# Patient Record
Sex: MALE | Race: BLACK OR AFRICAN AMERICAN | NOT HISPANIC OR LATINO | Employment: FULL TIME | URBAN - NONMETROPOLITAN AREA
[De-identification: names, ages, dates, MRNs, and addresses within clinical notes are randomized per-mention and may not be internally consistent; named-entity substitution may affect disease eponyms.]

---

## 2021-03-12 ENCOUNTER — IMMUNIZATION (OUTPATIENT)
Dept: VACCINE CLINIC | Facility: HOSPITAL | Age: 61
End: 2021-03-12

## 2021-03-12 PROCEDURE — 91300 HC SARSCOV02 VAC 30MCG/0.3ML IM: CPT | Performed by: INTERNAL MEDICINE

## 2021-03-12 PROCEDURE — 0001A: CPT | Performed by: INTERNAL MEDICINE

## 2021-04-02 ENCOUNTER — IMMUNIZATION (OUTPATIENT)
Dept: VACCINE CLINIC | Facility: HOSPITAL | Age: 61
End: 2021-04-02

## 2021-04-02 PROCEDURE — 0002A: CPT | Performed by: INTERNAL MEDICINE

## 2021-04-02 PROCEDURE — 91300 HC SARSCOV02 VAC 30MCG/0.3ML IM: CPT | Performed by: INTERNAL MEDICINE

## 2024-02-27 ENCOUNTER — OFFICE VISIT (OUTPATIENT)
Dept: GASTROENTEROLOGY | Facility: CLINIC | Age: 64
End: 2024-02-27
Payer: COMMERCIAL

## 2024-02-27 VITALS
HEIGHT: 74 IN | HEART RATE: 96 BPM | BODY MASS INDEX: 21.69 KG/M2 | OXYGEN SATURATION: 98 % | RESPIRATION RATE: 16 BRPM | WEIGHT: 169 LBS | SYSTOLIC BLOOD PRESSURE: 160 MMHG | DIASTOLIC BLOOD PRESSURE: 90 MMHG

## 2024-02-27 DIAGNOSIS — R10.13 DYSPEPSIA: ICD-10-CM

## 2024-02-27 DIAGNOSIS — R19.4 CHANGE IN BOWEL HABITS: Primary | ICD-10-CM

## 2024-02-27 DIAGNOSIS — R10.9 ABDOMINAL PAIN, UNSPECIFIED ABDOMINAL LOCATION: ICD-10-CM

## 2024-02-27 RX ORDER — SODIUM, POTASSIUM,MAG SULFATES 17.5-3.13G
1 SOLUTION, RECONSTITUTED, ORAL ORAL EVERY 12 HOURS
Qty: 354 ML | Refills: 0 | Status: SHIPPED | OUTPATIENT
Start: 2024-02-27 | End: 2024-02-28

## 2024-02-27 RX ORDER — AMLODIPINE BESYLATE 10 MG/1
1 TABLET ORAL DAILY
COMMUNITY
Start: 2023-12-05

## 2024-02-27 RX ORDER — BISOPROLOL FUMARATE AND HYDROCHLOROTHIAZIDE 5; 6.25 MG/1; MG/1
1 TABLET ORAL DAILY
COMMUNITY
Start: 2023-12-05

## 2024-02-27 NOTE — PROGRESS NOTES
New Patient Consult      Date: 2024   Patient Name: Mikie Jimenes  MRN: [id number]  : 1960     Referring Physician: Phyllis Reynolds APRN    Chief Complaint   Patient presents with    Abdominal Pain    Weight Loss       History of Present Illness: Mikie Jimenes is a 64 y.o. male who is here today to establish care with Gastroenterology.    Complains of abdominal pain, which is located sometimes in the upper abdomen, but at other times in the lower abdomen as well.  Usually after eating, although there are no specific dietary triggers.    Has been occurring for the past few months.  Has noticed weight loss as well.  No other localizing symptoms.  Denies blood per rectum.    He does notice an associated change in bowel habits however, and usually after the abdominal pain he has a prompt bowel movement, sometimes diarrhea, sometimes regular consistency.  No melena.  Denies NSAID use.    No prior upper endoscopy or colonoscopy.  No recent abdominal imaging.    Subjective      Past Medical History:   Diagnosis Date    Hyperlipidemia     Hypertension        Past Surgical History:   Procedure Laterality Date    CERVICAL SPINE SURGERY N/A        History reviewed. No pertinent family history.    Social History     Socioeconomic History    Marital status:    Tobacco Use    Smoking status: Every Day     Packs/day: 0.25     Years: 30.00     Additional pack years: 0.00     Total pack years: 7.50     Types: Cigarettes         Current Outpatient Medications:     amLODIPine (NORVASC) 10 MG tablet, Take 1 tablet by mouth Daily., Disp: , Rfl:     bisoprolol-hydrochlorothiazide (ZIAC) 5-6.25 MG per tablet, Take 1 tablet by mouth Daily., Disp: , Rfl:     sodium-potassium-magnesium sulfates (Suprep Bowel Prep Kit) 17.5-3.13-1.6 GM/177ML solution oral solution, Take 1 bottle by mouth Every 12 (Twelve) Hours for 1 day., Disp: 354 mL, Rfl: 0    No Known Allergies    Review of Systems   Constitutional:   "Positive for unexpected weight loss.   Gastrointestinal:  Positive for abdominal pain and diarrhea.   All other systems reviewed and are negative.      The following portions of the patient's history were reviewed and updated as appropriate: allergies, current medications, past family history, past medical history, past social history, past surgical history and problem list.    Objective     Physical Exam:  Vitals:    02/27/24 1544   BP: 160/90   Pulse: 96   Resp: 16   SpO2: 98%   Weight: 76.7 kg (169 lb)   Height: 188 cm (74\")       Physical Exam  Constitutional:       General: He is not in acute distress.     Appearance: Normal appearance.   HENT:      Head: Normocephalic and atraumatic.      Nose: Nose normal.      Mouth/Throat:      Mouth: Mucous membranes are moist.   Eyes:      General: No scleral icterus.     Conjunctiva/sclera: Conjunctivae normal.   Cardiovascular:      Rate and Rhythm: Normal rate.   Pulmonary:      Effort: Pulmonary effort is normal. No respiratory distress.   Abdominal:      General: There is no distension.      Tenderness: There is no abdominal tenderness. There is no guarding.   Musculoskeletal:         General: No deformity or signs of injury.      Cervical back: Neck supple. No rigidity.   Skin:     Capillary Refill: Capillary refill takes less than 2 seconds.      Coloration: Skin is not jaundiced or pale.   Neurological:      General: No focal deficit present.      Mental Status: He is alert and oriented to person, place, and time.   Psychiatric:         Mood and Affect: Mood normal.         Behavior: Behavior normal.         Results Review:   I have reviewed the patient's new clinical and imaging results and agree with the interpretation.     No visits with results within 90 Day(s) from this visit.   Latest known visit with results is:   No results found for any previous visit.      No radiology results for the last 90 days.    Assessment / Plan      Assessment & Plan:  Diagnoses " and all orders for this visit:    1. Change in bowel habits (Primary)  -     Follow Anesthesia Guidelines / Protocol; Future  -     Obtain Informed Consent; Future  -     Case Request; Standing  -     Case Request  -     sodium-potassium-magnesium sulfates (Suprep Bowel Prep Kit) 17.5-3.13-1.6 GM/177ML solution oral solution; Take 1 bottle by mouth Every 12 (Twelve) Hours for 1 day.  Dispense: 354 mL; Refill: 0  -     CT Abdomen Pelvis With Contrast; Future    2. Dyspepsia  -     Follow Anesthesia Guidelines / Protocol; Future  -     Obtain Informed Consent; Future  -     Case Request; Standing  -     Case Request  -     sodium-potassium-magnesium sulfates (Suprep Bowel Prep Kit) 17.5-3.13-1.6 GM/177ML solution oral solution; Take 1 bottle by mouth Every 12 (Twelve) Hours for 1 day.  Dispense: 354 mL; Refill: 0  -     CT Abdomen Pelvis With Contrast; Future    3. Abdominal pain, unspecified abdominal location  -     Follow Anesthesia Guidelines / Protocol; Future  -     Obtain Informed Consent; Future  -     Case Request; Standing  -     Case Request  -     sodium-potassium-magnesium sulfates (Suprep Bowel Prep Kit) 17.5-3.13-1.6 GM/177ML solution oral solution; Take 1 bottle by mouth Every 12 (Twelve) Hours for 1 day.  Dispense: 354 mL; Refill: 0  -     CT Abdomen Pelvis With Contrast; Future    Other orders  -     Follow Anesthesia Guidelines / Protocol; Standing  -     Verify NPO; Standing        Given the change in bowel habits, dyspepsia, other gastrointestinal symptoms without any obvious etiology, need formal evaluation.  This includes endoscopic evaluation, including other testing as above.      Will also obtain a CT scan given the weight loss and abdominal pain.    Broad differential diagnosis. Includes, but is not limited to colitis, ileitis, structural pathology, strictures, inflammatory disease, such as Crohn's or ulcerative colitis, polyps, peptic ulcer disease, gastritis, Helicobacter pylori.    Plan  for EGD/colonoscopy with monitored anesthesia care. Counseled in detail regarding the risks, benefits and alternatives of the procedure, including but not limited to perforation, bleeding, infection, post-operative pain, complications from anesthesia, aspiration, cardiac decompensation, need for further procedures or surgery, which may occur in approximately 1 in 1000 procedures. Counseled also that endoscopy and colonoscopy are not perfect tests, and there is a possibility of missed diagnoses, including but not limited to polyps or cancer. Counseled regarding pre procedural instructions. Also discussed bowel preparation. Counseled regarding potential, albeit rare complications with bowel preparation specific to this patients medical history and medications, including but not limited to renal insufficiency/failure, electrolyte abnormalities, which may lead to other complications. Hydration is encouraged. Written instructions provided. Instructed to arrange for a ride home for the day of procedure. Patient is in agreement with the above plan and voices understanding of the plan as well as the associated risks and the alternative evaluation/treatment/medication options.       Follow Up:   Return for Follow-up 3 months post procedure.    Cathie Navarro MD  [facility] [place]    2/27/2024  16:09 EST    Part of this note may be an electronic transcription/translation of spoken language to printed text using the Dragon Dictation System.

## 2024-02-27 NOTE — H&P (VIEW-ONLY)
New Patient Consult      Date: 2024   Patient Name: Mikie Jimenes  MRN: [id number]  : 1960     Referring Physician: Phyllis Reynolds APRN    Chief Complaint   Patient presents with    Abdominal Pain    Weight Loss       History of Present Illness: Mikie Jimenes is a 64 y.o. male who is here today to establish care with Gastroenterology.    Complains of abdominal pain, which is located sometimes in the upper abdomen, but at other times in the lower abdomen as well.  Usually after eating, although there are no specific dietary triggers.    Has been occurring for the past few months.  Has noticed weight loss as well.  No other localizing symptoms.  Denies blood per rectum.    He does notice an associated change in bowel habits however, and usually after the abdominal pain he has a prompt bowel movement, sometimes diarrhea, sometimes regular consistency.  No melena.  Denies NSAID use.    No prior upper endoscopy or colonoscopy.  No recent abdominal imaging.    Subjective      Past Medical History:   Diagnosis Date    Hyperlipidemia     Hypertension        Past Surgical History:   Procedure Laterality Date    CERVICAL SPINE SURGERY N/A        History reviewed. No pertinent family history.    Social History     Socioeconomic History    Marital status:    Tobacco Use    Smoking status: Every Day     Packs/day: 0.25     Years: 30.00     Additional pack years: 0.00     Total pack years: 7.50     Types: Cigarettes         Current Outpatient Medications:     amLODIPine (NORVASC) 10 MG tablet, Take 1 tablet by mouth Daily., Disp: , Rfl:     bisoprolol-hydrochlorothiazide (ZIAC) 5-6.25 MG per tablet, Take 1 tablet by mouth Daily., Disp: , Rfl:     sodium-potassium-magnesium sulfates (Suprep Bowel Prep Kit) 17.5-3.13-1.6 GM/177ML solution oral solution, Take 1 bottle by mouth Every 12 (Twelve) Hours for 1 day., Disp: 354 mL, Rfl: 0    No Known Allergies    Review of Systems   Constitutional:   "Positive for unexpected weight loss.   Gastrointestinal:  Positive for abdominal pain and diarrhea.   All other systems reviewed and are negative.      The following portions of the patient's history were reviewed and updated as appropriate: allergies, current medications, past family history, past medical history, past social history, past surgical history and problem list.    Objective     Physical Exam:  Vitals:    02/27/24 1544   BP: 160/90   Pulse: 96   Resp: 16   SpO2: 98%   Weight: 76.7 kg (169 lb)   Height: 188 cm (74\")       Physical Exam  Constitutional:       General: He is not in acute distress.     Appearance: Normal appearance.   HENT:      Head: Normocephalic and atraumatic.      Nose: Nose normal.      Mouth/Throat:      Mouth: Mucous membranes are moist.   Eyes:      General: No scleral icterus.     Conjunctiva/sclera: Conjunctivae normal.   Cardiovascular:      Rate and Rhythm: Normal rate.   Pulmonary:      Effort: Pulmonary effort is normal. No respiratory distress.   Abdominal:      General: There is no distension.      Tenderness: There is no abdominal tenderness. There is no guarding.   Musculoskeletal:         General: No deformity or signs of injury.      Cervical back: Neck supple. No rigidity.   Skin:     Capillary Refill: Capillary refill takes less than 2 seconds.      Coloration: Skin is not jaundiced or pale.   Neurological:      General: No focal deficit present.      Mental Status: He is alert and oriented to person, place, and time.   Psychiatric:         Mood and Affect: Mood normal.         Behavior: Behavior normal.         Results Review:   I have reviewed the patient's new clinical and imaging results and agree with the interpretation.     No visits with results within 90 Day(s) from this visit.   Latest known visit with results is:   No results found for any previous visit.      No radiology results for the last 90 days.    Assessment / Plan      Assessment & Plan:  Diagnoses " and all orders for this visit:    1. Change in bowel habits (Primary)  -     Follow Anesthesia Guidelines / Protocol; Future  -     Obtain Informed Consent; Future  -     Case Request; Standing  -     Case Request  -     sodium-potassium-magnesium sulfates (Suprep Bowel Prep Kit) 17.5-3.13-1.6 GM/177ML solution oral solution; Take 1 bottle by mouth Every 12 (Twelve) Hours for 1 day.  Dispense: 354 mL; Refill: 0  -     CT Abdomen Pelvis With Contrast; Future    2. Dyspepsia  -     Follow Anesthesia Guidelines / Protocol; Future  -     Obtain Informed Consent; Future  -     Case Request; Standing  -     Case Request  -     sodium-potassium-magnesium sulfates (Suprep Bowel Prep Kit) 17.5-3.13-1.6 GM/177ML solution oral solution; Take 1 bottle by mouth Every 12 (Twelve) Hours for 1 day.  Dispense: 354 mL; Refill: 0  -     CT Abdomen Pelvis With Contrast; Future    3. Abdominal pain, unspecified abdominal location  -     Follow Anesthesia Guidelines / Protocol; Future  -     Obtain Informed Consent; Future  -     Case Request; Standing  -     Case Request  -     sodium-potassium-magnesium sulfates (Suprep Bowel Prep Kit) 17.5-3.13-1.6 GM/177ML solution oral solution; Take 1 bottle by mouth Every 12 (Twelve) Hours for 1 day.  Dispense: 354 mL; Refill: 0  -     CT Abdomen Pelvis With Contrast; Future    Other orders  -     Follow Anesthesia Guidelines / Protocol; Standing  -     Verify NPO; Standing        Given the change in bowel habits, dyspepsia, other gastrointestinal symptoms without any obvious etiology, need formal evaluation.  This includes endoscopic evaluation, including other testing as above.      Will also obtain a CT scan given the weight loss and abdominal pain.    Broad differential diagnosis. Includes, but is not limited to colitis, ileitis, structural pathology, strictures, inflammatory disease, such as Crohn's or ulcerative colitis, polyps, peptic ulcer disease, gastritis, Helicobacter pylori.    Plan  for EGD/colonoscopy with monitored anesthesia care. Counseled in detail regarding the risks, benefits and alternatives of the procedure, including but not limited to perforation, bleeding, infection, post-operative pain, complications from anesthesia, aspiration, cardiac decompensation, need for further procedures or surgery, which may occur in approximately 1 in 1000 procedures. Counseled also that endoscopy and colonoscopy are not perfect tests, and there is a possibility of missed diagnoses, including but not limited to polyps or cancer. Counseled regarding pre procedural instructions. Also discussed bowel preparation. Counseled regarding potential, albeit rare complications with bowel preparation specific to this patients medical history and medications, including but not limited to renal insufficiency/failure, electrolyte abnormalities, which may lead to other complications. Hydration is encouraged. Written instructions provided. Instructed to arrange for a ride home for the day of procedure. Patient is in agreement with the above plan and voices understanding of the plan as well as the associated risks and the alternative evaluation/treatment/medication options.       Follow Up:   Return for Follow-up 3 months post procedure.    Cathie Navarro MD  G[facility]enter[facility] [place]    2/27/2024  16:09 EST    Part of this note may be an electronic transcription/translation of spoken language to printed text using the Dragon Dictation System.

## 2024-02-29 PROBLEM — R10.13 DYSPEPSIA: Status: ACTIVE | Noted: 2024-02-27

## 2024-02-29 PROBLEM — R10.9 ABDOMINAL PAIN: Status: ACTIVE | Noted: 2024-02-27

## 2024-02-29 PROBLEM — R19.4 CHANGE IN BOWEL HABITS: Status: ACTIVE | Noted: 2024-02-27

## 2024-03-18 ENCOUNTER — HOSPITAL ENCOUNTER (OUTPATIENT)
Dept: CT IMAGING | Facility: HOSPITAL | Age: 64
Discharge: HOME | End: 2024-03-18
Admitting: INTERNAL MEDICINE
Payer: COMMERCIAL

## 2024-03-18 DIAGNOSIS — R10.13 DYSPEPSIA: ICD-10-CM

## 2024-03-18 DIAGNOSIS — R19.4 CHANGE IN BOWEL HABITS: ICD-10-CM

## 2024-03-18 DIAGNOSIS — R10.9 ABDOMINAL PAIN, UNSPECIFIED ABDOMINAL LOCATION: ICD-10-CM

## 2024-03-18 PROCEDURE — 74177 CT ABD & PELVIS W/CONTRAST: CPT

## 2024-03-18 PROCEDURE — 25510000001 IOPAMIDOL 61 % SOLUTION: Performed by: INTERNAL MEDICINE

## 2024-03-18 RX ADMIN — IOPAMIDOL 100 ML: 612 INJECTION, SOLUTION INTRAVENOUS at 14:12

## 2024-03-26 ENCOUNTER — TELEPHONE (OUTPATIENT)
Dept: GASTROENTEROLOGY | Facility: CLINIC | Age: 64
End: 2024-03-26
Payer: COMMERCIAL

## 2024-03-26 NOTE — TELEPHONE ENCOUNTER
----- Message from Argenis Monaco MA sent at 3/26/2024  1:12 PM EDT -----    ----- Message -----  From: Cathie Navarro MD  Sent: 3/26/2024  12:12 PM EDT  To: [facility]    Please give the patient the following message:  ----- Results -----  This CT scan does not reveal any concerning bowel pathology, but there does appear to be atherosclerotic plaque disease in the blood vessels that supply the gut, which can sometimes result in abdominal pain symptoms.  Suggest follow-up office appointment to discuss further.

## 2024-03-27 NOTE — PRE-PROCEDURE INSTRUCTIONS
PAT phone history completed with patient for upcoming procedure on 3/28/24 with Dr. Navarro.    PAT PASS reviewed with patient and they verbalize understanding of the following:     Do not eat or drink anything after midnight the night before procedure unless otherwise instructed by physician/surgeon's office, this includes no gum, candy, mints, tobacco products or e-cigarettes.  Do not shave the area to be operated on at least 48 hours prior to procedure.  Do not wear makeup, lotion, hair products, or nail polish.  Do not wear any jewelry and remove all piercings.  Do not wear any adhesive if you wear dentures.  Do not wear contacts; bring in glasses if needed.  Only take medications on the morning of procedure as instructed by PAT nurse per anesthesia guidelines or as instructed by physician's office.  If you are on any blood thinners reach out to the physician/surgeon's office for instructions on when/if they will need to be stopped prior to procedure.  Bring in picture ID and insurance card, advanced directive copies if applicable, CPAP/BIPAP/Inhalers if indicated morning of procedure, leave any other valuables at home.  Ensure you have arranged for someone to drive you home the day of your procedure and someone to care for you at home afterwards. It is recommended that you do not drive, drink alcohol, or make any major legal decisions for at least 24 hours after your procedure is complete.    Instructions given on hospital entrance and registration location.

## 2024-03-28 ENCOUNTER — ANESTHESIA (OUTPATIENT)
Dept: GASTROENTEROLOGY | Facility: HOSPITAL | Age: 64
End: 2024-03-28
Payer: COMMERCIAL

## 2024-03-28 ENCOUNTER — ANESTHESIA EVENT (OUTPATIENT)
Dept: GASTROENTEROLOGY | Facility: HOSPITAL | Age: 64
End: 2024-03-28
Payer: COMMERCIAL

## 2024-03-28 ENCOUNTER — HOSPITAL ENCOUNTER (OUTPATIENT)
Facility: HOSPITAL | Age: 64
Setting detail: HOSPITAL OUTPATIENT SURGERY
Discharge: HOME | End: 2024-03-28
Attending: INTERNAL MEDICINE | Admitting: INTERNAL MEDICINE
Payer: COMMERCIAL

## 2024-03-28 VITALS
DIASTOLIC BLOOD PRESSURE: 48 MMHG | OXYGEN SATURATION: 99 % | HEART RATE: 55 BPM | HEIGHT: 72 IN | BODY MASS INDEX: 22.89 KG/M2 | WEIGHT: 169 LBS | TEMPERATURE: 97.1 F | RESPIRATION RATE: 16 BRPM | SYSTOLIC BLOOD PRESSURE: 125 MMHG

## 2024-03-28 DIAGNOSIS — R10.13 DYSPEPSIA: ICD-10-CM

## 2024-03-28 DIAGNOSIS — R10.9 ABDOMINAL PAIN, UNSPECIFIED ABDOMINAL LOCATION: ICD-10-CM

## 2024-03-28 DIAGNOSIS — R19.4 CHANGE IN BOWEL HABITS: ICD-10-CM

## 2024-03-28 PROCEDURE — 25010000002 FENTANYL CITRATE (PF) 50 MCG/ML SOLUTION: Performed by: NURSE ANESTHETIST, CERTIFIED REGISTERED

## 2024-03-28 PROCEDURE — 25010000002 MIDAZOLAM PER 1MG: Performed by: NURSE ANESTHETIST, CERTIFIED REGISTERED

## 2024-03-28 PROCEDURE — 25010000002 PROPOFOL 10 MG/ML EMULSION: Performed by: NURSE ANESTHETIST, CERTIFIED REGISTERED

## 2024-03-28 PROCEDURE — 25810000003 LACTATED RINGERS PER 1000 ML: Performed by: NURSE ANESTHETIST, CERTIFIED REGISTERED

## 2024-03-28 PROCEDURE — 43239 EGD BIOPSY SINGLE/MULTIPLE: CPT | Performed by: INTERNAL MEDICINE

## 2024-03-28 PROCEDURE — 45380 COLONOSCOPY AND BIOPSY: CPT | Performed by: INTERNAL MEDICINE

## 2024-03-28 PROCEDURE — 25810000003 LACTATED RINGERS PER 1000 ML: Performed by: INTERNAL MEDICINE

## 2024-03-28 RX ORDER — MIDAZOLAM HYDROCHLORIDE 2 MG/2ML
INJECTION, SOLUTION INTRAMUSCULAR; INTRAVENOUS AS NEEDED
Status: DISCONTINUED | OUTPATIENT
Start: 2024-03-28 | End: 2024-03-28 | Stop reason: SURG

## 2024-03-28 RX ORDER — PANTOPRAZOLE SODIUM 40 MG/1
40 TABLET, DELAYED RELEASE ORAL
Qty: 120 TABLET | Refills: 0 | Status: SHIPPED | OUTPATIENT
Start: 2024-03-28 | End: 2024-05-27

## 2024-03-28 RX ORDER — SODIUM CHLORIDE, SODIUM LACTATE, POTASSIUM CHLORIDE, CALCIUM CHLORIDE 600; 310; 30; 20 MG/100ML; MG/100ML; MG/100ML; MG/100ML
1000 INJECTION, SOLUTION INTRAVENOUS CONTINUOUS
Status: DISCONTINUED | OUTPATIENT
Start: 2024-03-28 | End: 2024-03-28 | Stop reason: HOSPADM

## 2024-03-28 RX ORDER — PROPOFOL 10 MG/ML
VIAL (ML) INTRAVENOUS AS NEEDED
Status: DISCONTINUED | OUTPATIENT
Start: 2024-03-28 | End: 2024-03-28 | Stop reason: SURG

## 2024-03-28 RX ORDER — FENTANYL CITRATE 50 UG/ML
INJECTION, SOLUTION INTRAMUSCULAR; INTRAVENOUS AS NEEDED
Status: DISCONTINUED | OUTPATIENT
Start: 2024-03-28 | End: 2024-03-28 | Stop reason: SURG

## 2024-03-28 RX ORDER — KETAMINE HCL IN NACL, ISO-OSM 100MG/10ML
SYRINGE (ML) INJECTION AS NEEDED
Status: DISCONTINUED | OUTPATIENT
Start: 2024-03-28 | End: 2024-03-28 | Stop reason: SURG

## 2024-03-28 RX ORDER — SODIUM CHLORIDE, SODIUM LACTATE, POTASSIUM CHLORIDE, CALCIUM CHLORIDE 600; 310; 30; 20 MG/100ML; MG/100ML; MG/100ML; MG/100ML
INJECTION, SOLUTION INTRAVENOUS CONTINUOUS PRN
Status: DISCONTINUED | OUTPATIENT
Start: 2024-03-28 | End: 2024-03-28 | Stop reason: SURG

## 2024-03-28 RX ORDER — SODIUM CHLORIDE 0.9 % (FLUSH) 0.9 %
10 SYRINGE (ML) INJECTION AS NEEDED
Status: DISCONTINUED | OUTPATIENT
Start: 2024-03-28 | End: 2024-03-28 | Stop reason: HOSPADM

## 2024-03-28 RX ORDER — SIMETHICONE 40MG/0.6ML
SUSPENSION, DROPS(FINAL DOSAGE FORM)(ML) ORAL AS NEEDED
Status: DISCONTINUED | OUTPATIENT
Start: 2024-03-28 | End: 2024-03-28 | Stop reason: HOSPADM

## 2024-03-28 RX ADMIN — FENTANYL CITRATE 50 MCG: 50 INJECTION, SOLUTION INTRAMUSCULAR; INTRAVENOUS at 13:37

## 2024-03-28 RX ADMIN — PROPOFOL 30 MG: 10 INJECTION, EMULSION INTRAVENOUS at 13:42

## 2024-03-28 RX ADMIN — PROPOFOL 30 MG: 10 INJECTION, EMULSION INTRAVENOUS at 13:57

## 2024-03-28 RX ADMIN — SODIUM CHLORIDE, POTASSIUM CHLORIDE, SODIUM LACTATE AND CALCIUM CHLORIDE 1000 ML: 600; 310; 30; 20 INJECTION, SOLUTION INTRAVENOUS at 12:28

## 2024-03-28 RX ADMIN — PROPOFOL 30 MG: 10 INJECTION, EMULSION INTRAVENOUS at 13:48

## 2024-03-28 RX ADMIN — PROPOFOL 30 MG: 10 INJECTION, EMULSION INTRAVENOUS at 14:06

## 2024-03-28 RX ADMIN — LIDOCAINE HYDROCHLORIDE 40 MG: 20 INJECTION, SOLUTION INTRAVENOUS at 13:40

## 2024-03-28 RX ADMIN — Medication 25 MG: at 13:45

## 2024-03-28 RX ADMIN — MIDAZOLAM HYDROCHLORIDE 2 MG: 1 INJECTION, SOLUTION INTRAMUSCULAR; INTRAVENOUS at 13:36

## 2024-03-28 RX ADMIN — PROPOFOL 30 MG: 10 INJECTION, EMULSION INTRAVENOUS at 14:03

## 2024-03-28 RX ADMIN — PROPOFOL 30 MG: 10 INJECTION, EMULSION INTRAVENOUS at 14:10

## 2024-03-28 RX ADMIN — PROPOFOL 50 MG: 10 INJECTION, EMULSION INTRAVENOUS at 13:40

## 2024-03-28 RX ADMIN — PROPOFOL 30 MG: 10 INJECTION, EMULSION INTRAVENOUS at 13:51

## 2024-03-28 RX ADMIN — PROPOFOL 30 MG: 10 INJECTION, EMULSION INTRAVENOUS at 13:54

## 2024-03-28 RX ADMIN — SODIUM CHLORIDE, POTASSIUM CHLORIDE, SODIUM LACTATE AND CALCIUM CHLORIDE: 600; 310; 30; 20 INJECTION, SOLUTION INTRAVENOUS at 11:33

## 2024-03-28 RX ADMIN — PROPOFOL 30 MG: 10 INJECTION, EMULSION INTRAVENOUS at 14:00

## 2024-03-28 RX ADMIN — Medication 25 MG: at 13:38

## 2024-03-28 RX ADMIN — PROPOFOL 30 MG: 10 INJECTION, EMULSION INTRAVENOUS at 13:45

## 2024-03-28 NOTE — ANESTHESIA PREPROCEDURE EVALUATION
Anesthesia Evaluation     Patient summary reviewed and Nursing notes reviewed   no history of anesthetic complications:   NPO Solid Status: > 8 hours  NPO Liquid Status: > 8 hours           Airway   Mallampati: III  TM distance: >3 FB  Neck ROM: limited  Possible difficult intubation and Difficult intubation highly probable  Dental      Pulmonary    (+) a smoker Current, COPD,shortness of breath, decreased breath sounds  Cardiovascular     (+) hypertension, ALEMAN, PVD, hyperlipidemia      Neuro/Psych  GI/Hepatic/Renal/Endo      Musculoskeletal     (+) arthralgias, back pain, chronic pain, myalgias, neck pain  Abdominal    Substance History   (+) alcohol use, drug use     OB/GYN          Other        ROS/Med Hx Other: Substance use                 Anesthesia Plan    ASA 3     MAC     (Risks and benefits discussed including risk of aspiration, recall and dental damage. All patient questions answered.    Will continue with plan of care.)  intravenous induction     Anesthetic plan, risks, benefits, and alternatives have been provided, discussed and informed consent has been obtained with: patient.  Pre-procedure education provided    CODE STATUS:

## 2024-03-28 NOTE — ANESTHESIA POSTPROCEDURE EVALUATION
Patient: Mikie Jimenes    Procedure Summary       Date: 03/28/24 Room / Location: [facility] ENDOSCOPY 1 / [facility] ENDOSCOPY    Anesthesia Start: 1336 Anesthesia Stop:     Procedures:       ESOPHAGOGASTRODUODENOSCOPY WITH BIOPSIES (Esophagus)      COLONOSCOPY WITH BIOPSIES AND POLYPECTOMY X4 (Anus) Diagnosis:       Change in bowel habits      Dyspepsia      Abdominal pain, unspecified abdominal location      (Change in bowel habits [R19.4])      (Dyspepsia [R10.13])      (Abdominal pain, unspecified abdominal location [R10.9])    Surgeons: Cathie Navarro MD Provider: Monroe Engel CRNA    Anesthesia Type: MAC ASA Status: 3            Anesthesia Type: MAC    Vitals  No vitals data found for the desired time range.          Post Anesthesia Care and Evaluation    Patient location during evaluation: PHASE II  Patient participation: complete - patient participated  Level of consciousness: awake  Pain score: 0  Pain management: adequate    Airway patency: patent  Anesthetic complications: No anesthetic complications  PONV Status: none  Cardiovascular status: acceptable  Respiratory status: acceptable, nasal cannula and spontaneous ventilation  Hydration status: acceptable    Comments: See R.N. note for postop vital signs.vsss resp spont, reflexes intact, responsive, report given to pacu nurse

## 2024-04-01 LAB — REF LAB TEST METHOD: NORMAL

## 2024-04-03 ENCOUNTER — TELEPHONE (OUTPATIENT)
Dept: GASTROENTEROLOGY | Facility: CLINIC | Age: 64
End: 2024-04-03
Payer: COMMERCIAL

## 2024-04-03 NOTE — TELEPHONE ENCOUNTER
LVM regarding    Please give the patient the following message:   ----- Results -----   Duodenum and gastric biopsies do not reveal any concerning findings.  Negative for celiac disease and Helicobacter pylori.   Terminal ileum and random colon biopsies are unremarkable.  No evidence of Crohn's disease or colitis.   The resected rectal polyps were noted to be precancerous in nature.

## 2024-04-03 NOTE — PROGRESS NOTES
Please give the patient the following message:  ----- Results -----  Duodenum and gastric biopsies do not reveal any concerning findings.  Negative for celiac disease and Helicobacter pylori.  Terminal ileum and random colon biopsies are unremarkable.  No evidence of Crohn's disease or colitis.  The resected rectal polyps were noted to be precancerous in nature.

## 2024-04-03 NOTE — TELEPHONE ENCOUNTER
----- Message from Gisselle Brewer MA sent at 4/3/2024 12:06 PM EDT -----    ----- Message -----  From: Cathie Navarro MD  Sent: 4/3/2024  12:05 PM EDT  To: [facility]    Please give the patient the following message:  ----- Results -----  Duodenum and gastric biopsies do not reveal any concerning findings.  Negative for celiac disease and Helicobacter pylori.  Terminal ileum and random colon biopsies are unremarkable.  No evidence of Crohn's disease or colitis.  The resected rectal polyps were noted to be precancerous in nature.

## 2024-04-29 ENCOUNTER — OFFICE VISIT (OUTPATIENT)
Dept: GASTROENTEROLOGY | Facility: CLINIC | Age: 64
End: 2024-04-29
Payer: COMMERCIAL

## 2024-04-29 VITALS
BODY MASS INDEX: 23.19 KG/M2 | WEIGHT: 171 LBS | SYSTOLIC BLOOD PRESSURE: 134 MMHG | OXYGEN SATURATION: 99 % | HEART RATE: 54 BPM | DIASTOLIC BLOOD PRESSURE: 70 MMHG

## 2024-04-29 DIAGNOSIS — R63.4 WEIGHT LOSS: Primary | ICD-10-CM

## 2024-04-29 DIAGNOSIS — R10.9 ABDOMINAL PAIN, UNSPECIFIED ABDOMINAL LOCATION: ICD-10-CM

## 2024-04-29 DIAGNOSIS — K27.9 PUD (PEPTIC ULCER DISEASE): ICD-10-CM

## 2024-04-29 PROCEDURE — 99214 OFFICE O/P EST MOD 30 MIN: CPT | Performed by: INTERNAL MEDICINE

## 2024-04-29 RX ORDER — PANTOPRAZOLE SODIUM 40 MG/1
40 TABLET, DELAYED RELEASE ORAL
Qty: 120 TABLET | Refills: 0 | Status: SHIPPED | OUTPATIENT
Start: 2024-04-29 | End: 2024-06-28

## 2024-04-29 NOTE — PROGRESS NOTES
Follow Up Note     Date: 2024   Patient Name: Mikie Jimenes  MRN: [id number]  : 1960     Referring Physician: Jeffery Holloway MD    Chief Complaint:    Chief Complaint   Patient presents with    Follow-up    Change in bowel habits       Interval History:   2024  Mikie Jimenes is a 64 y.o. male who is here today for follow up.    He states that his weight has stabilized. Still having pain after eating however.  This is with any food.  Sometimes citrusy things will bother his stomach.  He did have erosive gastritis noted, but findings were not severe enough to explain the degree of his pain, and most likely not severe enough to explain a 50 pound weight loss over the past year because he does not want to eat out of fear of abdominal pain.    He did undergo a CT scan, and it does show significant mesenteric atherosclerosis and calcified plaque.  They mention the possibility of mesenteric stenosis.    No other significant findings on endoscopic biopsies.      Subjective      Past Medical History:   Diagnosis Date    Hyperlipidemia     Hypertension     TIA (transient ischemic attack) 2017    Wears glasses     Wears partial dentures      Past Surgical History:   Procedure Laterality Date    CERVICAL SPINE SURGERY N/A     cadaver bones and screws implanted    COLONOSCOPY N/A 3/28/2024    Procedure: COLONOSCOPY WITH BIOPSIES AND POLYPECTOMY X4;  Surgeon: Cathie Navarro MD;  Location: [facility] ENDOSCOPY;  Service: Gastroenterology;  Laterality: N/A;    ENDOSCOPY N/A 3/28/2024    Procedure: ESOPHAGOGASTRODUODENOSCOPY WITH BIOPSIES;  Surgeon: Cathie Navarro MD;  Location: [facility] ENDOSCOPY;  Service: Gastroenterology;  Laterality: N/A;     History reviewed. No pertinent family history.  Social History     Socioeconomic History    Marital status:    Tobacco Use    Smoking status: Every Day     Current packs/day: 0.25     Average packs/day: 0.3 packs/day for 30.0 years (7.5 ttl pk-yrs)      Types: Cigarettes    Smokeless tobacco: Never   Vaping Use    Vaping status: Never Used   Substance and Sexual Activity    Alcohol use: Yes     Alcohol/week: 1.0 standard drink of alcohol     Types: 1 Drinks containing 0.5 oz of alcohol per week    Drug use: Yes     Types: Marijuana     Comment: daily       Current Outpatient Medications:     amLODIPine (NORVASC) 10 MG tablet, Take 1 tablet by mouth Daily., Disp: , Rfl:     bisoprolol-hydrochlorothiazide (ZIAC) 5-6.25 MG per tablet, Take 1 tablet by mouth Daily., Disp: , Rfl:     pantoprazole (PROTONIX) 40 MG EC tablet, Take 1 tablet by mouth 2 (Two) Times a Day Before Meals for 60 days., Disp: 120 tablet, Rfl: 0  No Known Allergies  Review of Systems   Constitutional:  Negative for unexpected weight loss.   HENT:  Negative for trouble swallowing.    Gastrointestinal:  Positive for abdominal pain and diarrhea. Negative for abdominal distention, anal bleeding, blood in stool, constipation, nausea, rectal pain, vomiting, GERD and indigestion.       The following portions of the patient's history were reviewed and updated as appropriate: allergies, current medications, past family history, past medical history, past social history, past surgical history and problem list.    Objective     Physical Exam:  Vitals:    04/29/24 1103   BP: 134/70   Pulse: 54   SpO2: 99%   Weight: 77.6 kg (171 lb)       Physical Exam  Constitutional:       General: He is not in acute distress.     Appearance: Normal appearance.   HENT:      Head: Normocephalic and atraumatic.      Mouth/Throat:      Mouth: Mucous membranes are moist.   Eyes:      General: No scleral icterus.     Conjunctiva/sclera: Conjunctivae normal.   Cardiovascular:      Rate and Rhythm: Normal rate.   Pulmonary:      Effort: Pulmonary effort is normal. No respiratory distress.   Musculoskeletal:         General: No deformity or signs of injury.   Skin:     Capillary Refill: Capillary refill takes less than 2 seconds.       Coloration: Skin is not jaundiced or pale.   Neurological:      General: No focal deficit present.      Mental Status: He is alert and oriented to person, place, and time.   Psychiatric:         Mood and Affect: Mood normal.         Behavior: Behavior normal.         Results Review:   I reviewed the patient's new clinical results.    Admission on 2024, Discharged on 2024   Component Date Value Ref Range Status    Reference Lab Report 2024    Final                    Value:[facility]  [address] [place] [address]  Phone: [phone] or [phone]  Fax: [phone]  Huy Kramer M.D., Medical Director    PATIENT NAME                           LABORATORY NO.  COLTON HANSON                      [id number]  [id number]                         AGE              SEX  SSN           CLIENT REF #  [facility]            64      1960      [id number]   [id number]    [address] [place] [address] BY-PASS                REQUESTING M.D.     ATTENDING MBARRETT     COPY TO.   [address]                        [place] [address]                 DATE COLLECTED      DATE RECEIVED      DATE REPORTED  2024    DIAGNOSIS:  A.   DUODENUM, BIOPSY:  Duodenal type mucosa with no significant histopathologic abnormalities  B.   GASTRIC ANTRUM, BIOPSY:  Gastric antral type mucosa with mild chronic inactive gastritis  Negative for intestinal metaplasia or                           dysplasia  No Helicobacter pylori-like organisms seen  C.   TERMINAL ILEUM BIOPSY:  Small intestinal mucosa with no significant histopathologic abnormalities  D.   TRANSVERSE COLON BIOPSIES, LEFT, RIGHT:  Colonic type mucosa with no significant histopathologic abnormalities  E.   RECTAL POLYPS, X4:  Hyperplastic polyps    KAITLIN    CLINICAL HISTORY:  Change in bowel habits, dyspepsia, abdominal pain, unspecified  "abdominal  location    SPECIMENS RECEIVED:  A.  DUODENUM, BIOPSY  B.  GASTRIC ANTRUM, BIOPSY  C.  TERMINAL ILEUM BIOPSY  D.  TRANSVERSE COLON BIOPSIES, LEFT, RIGHT  E.  RECTAL POLYPS, X4    MICROSCOPIC DESCRIPTION:  Tissue blocks are prepared and slides are examined microscopically on all  specimens. See diagnosis for details.    Professional interpretation rendered by Yandel Bertrand M.D., EMMANUELLE at [employer], [address] [place] [address].    GROSS DESCRIPTION:  A.  Labeled as \"duodenum biopsy\", consisting of 2 pieces of tan soft tissue  measuring 0.4 x 0.4 x 0.2                           cm, submitted entirely in 1 cassette.  SOG  B.   labeled as \"gastric antrum\", consisting of 2 pieces of tan soft tissue  measuring 0.5 x 0.4 x 0.2 cm, submitted entirely in 1 cassette.  C.  Labeled as \"terminal ileum\", consisting of 1 piece of tan soft tissue  measuring 0.3 x 0.2 x 0.2 cm, submitted entirely in 1 cassette.  D.  Labeled as \"left, right, and transverse colon biopsies\", consisting of  multiple pieces of tan soft tissue measuring 0.7 x 0.4 x 0.2 cm in aggregate,  submitted entirely in 1 cassette.  E.  Labeled as \"rectum polyp x 4\", consisting of 1 piece of tan soft tissue  measuring 0.4 x 0.3 x 0.2 cm, submitted entirely in 1 cassette.    REVIEWED, DIAGNOSED AND ELECTRONICALLY  SIGNED BY:    Yandel Bertrand M.D., FERNANDA.  CPT CODES:  88305x5        CT Abdomen Pelvis With Contrast    Result Date: 3/18/2024  1. No evidence of bowel obstruction. 2. Significant calcified plaque disease of the mesenteric vessels probably resulting in mesenteric stenoses.   CTDI: 8.87 mGy DLP: 414.63 mGy.cm   This study was performed with techniques to keep radiation doses as low as reasonably achievable (ALARA). Individualized dose reduction techniques using automated exposure control or adjustment of mA and/or kV according to the patient size were employed.      Images were reviewed, interpreted, and dictated by Dr. Humphries " MD Shayla Transcribed by Sofia Aguilar PA-C.  This report was signed and finalized on 3/18/2024 3:04 PM by Yandel Mcguire MD.       Assessment / Plan      Diagnoses and all orders for this visit:    1. Weight loss (Primary)  -     XR Chest 2 View; Future  -     Ambulatory Referral to Vascular Surgery    2. Abdominal pain, unspecified abdominal location  -     Ambulatory Referral to Vascular Surgery    3. PUD (peptic ulcer disease)    Other orders  -     pantoprazole (PROTONIX) 40 MG EC tablet; Take 1 tablet by mouth 2 (Two) Times a Day Before Meals for 60 days.  Dispense: 120 tablet; Refill: 0         Will continue PPI for now given that it has produced some symptom relief.  Still does not explain the degree of weight loss and abdominal pain with eating.  Suspect that the mesenteric plaque is a contributing factor here.  Will check a chest x-ray as he has not had any recent chest imaging given the weight loss.  Refer to vascular surgery for evaluation and recommendations regarding possible mesenteric stenosis.      Follow Up:   Return in about 6 months (around 10/29/2024).    Cathie Navarro MD  [facility]  4/29/2024  11:53 EDT     Part of this note may be an electronic transcription/translation of spoken language to printed text using the Dragon Dictation System.

## 2024-10-12 ENCOUNTER — APPOINTMENT (OUTPATIENT)
Dept: CT IMAGING | Facility: HOSPITAL | Age: 64
End: 2024-10-12
Payer: COMMERCIAL

## 2024-10-12 ENCOUNTER — HOSPITAL ENCOUNTER (EMERGENCY)
Facility: HOSPITAL | Age: 64
Discharge: HOME | End: 2024-10-12
Attending: STUDENT IN AN ORGANIZED HEALTH CARE EDUCATION/TRAINING PROGRAM
Payer: COMMERCIAL

## 2024-10-12 VITALS
RESPIRATION RATE: 14 BRPM | HEIGHT: 72 IN | HEART RATE: 54 BPM | TEMPERATURE: 97.7 F | DIASTOLIC BLOOD PRESSURE: 72 MMHG | BODY MASS INDEX: 23.3 KG/M2 | WEIGHT: 172 LBS | SYSTOLIC BLOOD PRESSURE: 135 MMHG | OXYGEN SATURATION: 100 %

## 2024-10-12 DIAGNOSIS — S01.111A EYEBROW LACERATION, RIGHT, INITIAL ENCOUNTER: Primary | ICD-10-CM

## 2024-10-12 LAB
ALBUMIN SERPL-MCNC: 4.6 G/DL (ref 3.5–5.2)
ALBUMIN/GLOB SERPL: 1.4 G/DL
ALP SERPL-CCNC: 93 U/L (ref 39–117)
ALT SERPL W P-5'-P-CCNC: 18 U/L (ref 1–41)
ANION GAP SERPL CALCULATED.3IONS-SCNC: 9.9 MMOL/L (ref 5–15)
AST SERPL-CCNC: 31 U/L (ref 1–40)
BASOPHILS # BLD AUTO: 0.02 10*3/MM3 (ref 0–0.2)
BASOPHILS NFR BLD AUTO: 0.5 % (ref 0–1.5)
BILIRUB SERPL-MCNC: 0.3 MG/DL (ref 0–1.2)
BUN SERPL-MCNC: 20 MG/DL (ref 8–23)
BUN/CREAT SERPL: 20.6 (ref 7–25)
CALCIUM SPEC-SCNC: 9.5 MG/DL (ref 8.6–10.5)
CHLORIDE SERPL-SCNC: 102 MMOL/L (ref 98–107)
CO2 SERPL-SCNC: 25.1 MMOL/L (ref 22–29)
CREAT SERPL-MCNC: 0.97 MG/DL (ref 0.76–1.27)
DEPRECATED RDW RBC AUTO: 47 FL (ref 37–54)
EGFRCR SERPLBLD CKD-EPI 2021: 87.7 ML/MIN/1.73
EOSINOPHIL # BLD AUTO: 0.08 10*3/MM3 (ref 0–0.4)
EOSINOPHIL NFR BLD AUTO: 1.9 % (ref 0.3–6.2)
ERYTHROCYTE [DISTWIDTH] IN BLOOD BY AUTOMATED COUNT: 14.6 % (ref 12.3–15.4)
GLOBULIN UR ELPH-MCNC: 3.4 GM/DL
GLUCOSE SERPL-MCNC: 109 MG/DL (ref 65–99)
HCT VFR BLD AUTO: 38.7 % (ref 37.5–51)
HGB BLD-MCNC: 12.9 G/DL (ref 13–17.7)
IMM GRANULOCYTES # BLD AUTO: 0.01 10*3/MM3 (ref 0–0.05)
IMM GRANULOCYTES NFR BLD AUTO: 0.2 % (ref 0–0.5)
LYMPHOCYTES # BLD AUTO: 1.49 10*3/MM3 (ref 0.7–3.1)
LYMPHOCYTES NFR BLD AUTO: 35.6 % (ref 19.6–45.3)
MCH RBC QN AUTO: 29.5 PG (ref 26.6–33)
MCHC RBC AUTO-ENTMCNC: 33.3 G/DL (ref 31.5–35.7)
MCV RBC AUTO: 88.6 FL (ref 79–97)
MONOCYTES # BLD AUTO: 0.3 10*3/MM3 (ref 0.1–0.9)
MONOCYTES NFR BLD AUTO: 7.2 % (ref 5–12)
NEUTROPHILS NFR BLD AUTO: 2.28 10*3/MM3 (ref 1.7–7)
NEUTROPHILS NFR BLD AUTO: 54.6 % (ref 42.7–76)
NRBC BLD AUTO-RTO: 0 /100 WBC (ref 0–0.2)
PLATELET # BLD AUTO: 333 10*3/MM3 (ref 140–450)
PMV BLD AUTO: 9 FL (ref 6–12)
POTASSIUM SERPL-SCNC: 4.1 MMOL/L (ref 3.5–5.2)
PROT SERPL-MCNC: 8 G/DL (ref 6–8.5)
RBC # BLD AUTO: 4.37 10*6/MM3 (ref 4.14–5.8)
SODIUM SERPL-SCNC: 137 MMOL/L (ref 136–145)
WBC NRBC COR # BLD AUTO: 4.18 10*3/MM3 (ref 3.4–10.8)

## 2024-10-12 PROCEDURE — 85025 COMPLETE CBC W/AUTO DIFF WBC: CPT | Performed by: NURSE PRACTITIONER

## 2024-10-12 PROCEDURE — 25010000002 LIDOCAINE 1 % SOLUTION: Performed by: NURSE PRACTITIONER

## 2024-10-12 PROCEDURE — 70450 CT HEAD/BRAIN W/O DYE: CPT

## 2024-10-12 PROCEDURE — 99284 EMERGENCY DEPT VISIT MOD MDM: CPT

## 2024-10-12 PROCEDURE — 93005 ELECTROCARDIOGRAM TRACING: CPT | Performed by: NURSE PRACTITIONER

## 2024-10-12 PROCEDURE — 80053 COMPREHEN METABOLIC PANEL: CPT | Performed by: NURSE PRACTITIONER

## 2024-10-12 RX ORDER — CLOPIDOGREL BISULFATE 75 MG/1
75 TABLET ORAL DAILY
COMMUNITY

## 2024-10-12 RX ORDER — LIDOCAINE HYDROCHLORIDE 10 MG/ML
10 INJECTION, SOLUTION INFILTRATION; PERINEURAL ONCE
Status: COMPLETED | OUTPATIENT
Start: 2024-10-12 | End: 2024-10-12

## 2024-10-12 RX ORDER — PANTOPRAZOLE SODIUM 40 MG/1
40 TABLET, DELAYED RELEASE ORAL
COMMUNITY

## 2024-10-12 RX ORDER — PRAVASTATIN SODIUM 20 MG
20 TABLET ORAL DAILY
COMMUNITY

## 2024-10-12 RX ADMIN — LIDOCAINE HYDROCHLORIDE 10 ML: 10 INJECTION, SOLUTION INFILTRATION; PERINEURAL at 09:16

## 2024-10-12 NOTE — ED PROVIDER NOTES
Pt Name: Mikie Jimenes  MRN: [id number]  : 1960  Date of Encounter: 10/12/2024    PCP: Jeffery Holloway MD      Subjective    History of Present Illness:    Chief Complaint: Right eyebrow laceration    History of Present Illness: Mikie Jimenes is a 64 y.o. male who presents to the ER complaining of right eyelid brow laceration that started last night around midnight.  Patient states he got hot stood up to turn his ceiling fan on got a little dizzy fell hitting his head on floor.  Patient denies any loss of consciousness any anticoagulation.  Patient states feeling of weakness had resolved.  Pain is described as Throbbing, Constant, and does not radiate  Patient rates pain as a 6 on a ten scale.    Triage Vitals:    ED Triage Vitals [10/12/24 0851]   Temp Heart Rate Resp BP SpO2   97.5 °F (36.4 °C) 59 14 143/80 100 %      Temp src Heart Rate Source Patient Position BP Location FiO2 (%)   Oral Monitor Sitting Left arm --       Nurses Notes reviewed and agree, including vitals, allergies, social history and prior medical history.     Patient has no known allergies.    Past Medical History:   Diagnosis Date    Hyperlipidemia     Hypertension     TIA (transient ischemic attack) 2017    Wears glasses     Wears partial dentures        Past Surgical History:   Procedure Laterality Date    CERVICAL SPINE SURGERY N/A     cadaver bones and screws implanted    COLONOSCOPY N/A 3/28/2024    Procedure: COLONOSCOPY WITH BIOPSIES AND POLYPECTOMY X4;  Surgeon: Cathie Navarro MD;  Location: [facility] ENDOSCOPY;  Service: Gastroenterology;  Laterality: N/A;    ENDOSCOPY N/A 3/28/2024    Procedure: ESOPHAGOGASTRODUODENOSCOPY WITH BIOPSIES;  Surgeon: Cathie Navarro MD;  Location: [facility] ENDOSCOPY;  Service: Gastroenterology;  Laterality: N/A;       Social History     Socioeconomic History    Marital status:    Tobacco Use    Smoking status: Every Day     Current packs/day: 0.25     Average packs/day: 0.3 packs/day for  30.0 years (7.5 ttl pk-yrs)     Types: Cigarettes    Smokeless tobacco: Never   Vaping Use    Vaping status: Never Used   Substance and Sexual Activity    Alcohol use: Yes     Alcohol/week: 1.0 standard drink of alcohol     Types: 1 Drinks containing 0.5 oz of alcohol per week    Drug use: Yes     Types: Marijuana     Comment: daily    Sexual activity: Defer       History reviewed. No pertinent family history.    REVIEW OF SYSTEMS:     All systems reviewed and not pertinent unless noted.    Review of Systems   HENT:  Positive for facial swelling.    Eyes:  Positive for pain.   Neurological:  Positive for syncope.   All other systems reviewed and are negative.      Objective    Physical Exam  Vitals and nursing note reviewed.   Constitutional:       Appearance: Normal appearance.   HENT:      Head: Normocephalic. Laceration present.      Jaw: Swelling present.   Eyes:      Extraocular Movements: Extraocular movements intact.      Pupils: Pupils are equal, round, and reactive to light.        Comments: Right eyebrow laceration approximately 3 cm in length, half of centimeter in depth   Cardiovascular:      Rate and Rhythm: Normal rate and regular rhythm.      Pulses: Normal pulses.      Heart sounds: Normal heart sounds.   Pulmonary:      Effort: Pulmonary effort is normal.      Breath sounds: Normal breath sounds.   Abdominal:      General: Bowel sounds are normal.      Palpations: Abdomen is soft.   Musculoskeletal:         General: Normal range of motion.      Cervical back: Normal range of motion and neck supple.   Skin:     General: Skin is warm and dry.      Capillary Refill: Capillary refill takes less than 2 seconds.   Neurological:      Mental Status: He is alert.      GCS: GCS eye subscore is 4. GCS verbal subscore is 5. GCS motor subscore is 6.      Sensory: Sensation is intact.      Motor: Motor function is intact.   Psychiatric:         Attention and Perception: Attention and perception normal.          Mood and Affect: Mood and affect normal.         Speech: Speech normal.                                 Laceration Repair    Date/Time: 10/12/2024 10:51 AM    Performed by: Nakul Sullivan APRN  Authorized by: Hawk Carlson MD    Consent:     Consent obtained:  Verbal    Consent given by:  Patient    Risks discussed:  Infection, pain, poor cosmetic result, need for additional repair, poor wound healing and vascular damage    Alternatives discussed:  No treatment, delayed treatment, observation and referral  Universal protocol:     Procedure explained and questions answered to patient or proxy's satisfaction: yes      Relevant documents present and verified: yes      Imaging studies available: yes      Site/side marked: yes      Immediately prior to procedure, a time out was called: yes      Patient identity confirmed:  Verbally with patient  Anesthesia:     Anesthesia method:  Local infiltration    Local anesthetic:  Lidocaine 1% w/o epi  Laceration details:     Location:  Face    Face location:  R eyebrow    Length (cm):  3    Depth (mm):  5  Pre-procedure details:     Preparation:  Patient was prepped and draped in usual sterile fashion  Treatment:     Area cleansed with:  Chlorhexidine    Amount of cleaning:  Standard    Irrigation solution:  Tap water    Irrigation volume:  10    Irrigation method:  Syringe    Layers/structures repaired:  Deep subcutaneous  Deep subcutaneous:     Suture size:  4-0    Suture material:  Vicryl    Suture technique:  Simple interrupted    Number of sutures:  2  Skin repair:     Repair method:  Sutures    Suture size:  5-0    Suture material:  Fast-absorbing gut    Number of sutures:  5  Approximation:     Approximation:  Close  Repair type:     Repair type:  Simple  Post-procedure details:     Procedure completion:  Tolerated      ED Course:    ECG 12 Lead Syncope   Final Result          ED Course as of 10/12/24 1057   Sat Oct 12, 2024   0927 EKG interpreted by me, sinus  bradycardia with no concerning ST changes noted, rate of 53 [JE]   0948 Radiographic images from CT head independently interpreted by me prior to radiology overread and shows no acute hemorrhage or ostial abnormality. [KH]      ED Course User Index  [JE] Hawk Carlson MD  [] Nakul Sullivan APRN       Orders placed during this visit:    Orders Placed This Encounter   Procedures    Laceration Repair    CT Head Without Contrast    Comprehensive Metabolic Panel    CBC Auto Differential    ECG 12 Lead Syncope    CBC & Differential       LAB Results:    Lab Results (last 24 hours)       Procedure Component Value Units Date/Time    CBC & Differential [[id number]]  (Abnormal) Collected: 10/12/24 0914    Specimen: Blood Updated: 10/12/24 0921    Narrative:      The following orders were created for panel order CBC & Differential.  Procedure                               Abnormality         Status                     ---------                               -----------         ------                     CBC Auto Differential[[id number]]        Abnormal            Final result                 Please view results for these tests on the individual orders.    Comprehensive Metabolic Panel [[id number]]  (Abnormal) Collected: 10/12/24 0914    Specimen: Blood Updated: 10/12/24 0953     Glucose 109 mg/dL      BUN 20 mg/dL      Creatinine 0.97 mg/dL      Sodium 137 mmol/L      Potassium 4.1 mmol/L      Comment: Slight hemolysis detected by analyzer. Result may be falsely elevated.        Chloride 102 mmol/L      CO2 25.1 mmol/L      Calcium 9.5 mg/dL      Total Protein 8.0 g/dL      Albumin 4.6 g/dL      ALT (SGPT) 18 U/L      AST (SGOT) 31 U/L      Comment: Slight hemolysis detected by analyzer. Result may be falsely elevated.        Alkaline Phosphatase 93 U/L      Total Bilirubin 0.3 mg/dL      Globulin 3.4 gm/dL      A/G Ratio 1.4 g/dL      BUN/Creatinine Ratio 20.6     Anion Gap 9.9 mmol/L      eGFR 87.7 mL/min/1.73      Narrative:      GFR Normal >60  Chronic Kidney Disease <60  Kidney Failure <15      CBC Auto Differential [[id number]]  (Abnormal) Collected: 10/12/24 0914    Specimen: Blood Updated: 10/12/24 0921     WBC 4.18 10*3/mm3      RBC 4.37 10*6/mm3      Hemoglobin 12.9 g/dL      Hematocrit 38.7 %      MCV 88.6 fL      MCH 29.5 pg      MCHC 33.3 g/dL      RDW 14.6 %      RDW-SD 47.0 fl      MPV 9.0 fL      Platelets 333 10*3/mm3      Neutrophil % 54.6 %      Lymphocyte % 35.6 %      Monocyte % 7.2 %      Eosinophil % 1.9 %      Basophil % 0.5 %      Immature Grans % 0.2 %      Neutrophils, Absolute 2.28 10*3/mm3      Lymphocytes, Absolute 1.49 10*3/mm3      Monocytes, Absolute 0.30 10*3/mm3      Eosinophils, Absolute 0.08 10*3/mm3      Basophils, Absolute 0.02 10*3/mm3      Immature Grans, Absolute 0.01 10*3/mm3      nRBC 0.0 /100 WBC              If labs were ordered, I have independently reviewed the results and considered them in the diagnosis and treatment plan for the patient    RADIOLOGY    CT Head Without Contrast    Result Date: 10/12/2024  PROCEDURE: CT HEAD WO CONTRAST-  INDICATION: Syncope, facial trauma  TECHNIQUE: Multiple axial CT images were performed from the foramen magnum to the vertex without contrast.   Coronal reconstruction images were obtained from the axial data.  COMPARISON: None.  FINDINGS: There is no mass effect or midline shift.  No hydrocephalus or intracranial hemorrhage.  The posterior fossa is without acute abnormality. The basilar cisterns are preserved..  The soft tissues are without acute abnormality..  No acute osseous abnormalities are present.      Impression: No acute intracranial abnormality.         CTDI: 36.88 mGy DLP:703.26 mGy.cm    This study was performed with techniques to keep radiation doses as low as reasonably achievable (ALARA). Individualized dose reduction techniques using automated exposure control or adjustment of mA and/or kV according to the patient size were  employed.   This report was signed and finalized on 10/12/2024 10:50 AM by Jalyn Pinto MD.        If I have ordered, I have independently reviewed the above noted radiographic studies.  Please see the radiologist dictation for the official interpretation    Medications given to patient in the ER    Medications   lidocaine (XYLOCAINE) 1 % injection 10 mL (10 mL Injection Given 10/12/24 0916)       AS OF 10:57 EDT VITALS:    BP - 135/72  HR - 54  TEMP - 97.7 °F (36.5 °C) (Oral)  O2 SATS - 100%         Shared Decision Making: After my consideration of the clinical presentation and laboratory/radiology studies obtained, I have discussed the findings with the patient/patient representative who is in agreement with the treatment plan and final disposition. Risks and benefits of discharge and/or observation admission were discussed.  Final disposition of the patient will be discharged.  Patient is requested to follow-up with primary care provider and specialist in 1 week following final discharge.      Medical Decision Making  Mikie Jimenes is a 64 y.o. male who presents to the ER complaining of right eyelid brow laceration that started last night around midnight.  Patient states he got hot stood up to turn his ceiling fan on got a little dizzy fell hitting his head on floor.  Patient denies any loss of consciousness any anticoagulation.  Patient states feeling of weakness had resolved.  Pain is described as Throbbing, Constant, and does not radiate  Patient rates pain as a 6 on a ten scale.    DDX: includes but is not limited to: Right eyebrow laceration, orbital fracture, intracranial abnormality, other    Amount and/or Complexity of Data Reviewed  Labs: ordered. Decision-making details documented in ED Course.     Details: I have personally reviewed and documented all results  Radiology: ordered and independent interpretation performed. Decision-making details documented in ED Course.     Details: I have personally  reviewed and documented all results  ECG/medicine tests: ordered.     Details: I have personally reviewed and documented all results  Discussion of management or test interpretation with external provider(s): Discussed assessment, treatment and plan with ER attending    Risk  Prescription drug management.  Risk Details: I have discussed with patient the finding of the test preformed today. Patient has been diagnosed with right eyebrow laceration and will be discharged home.  Patient requested to follow-up with primary care provider within the next 7 days for reevaluation. Strict return precautions have been given and patient verbalizes understanding        Final diagnoses:   Eyebrow laceration, right, initial encounter       Please note that portions of this document were completed using voice recognition dictation software.       Nakul Sullivan, APRN  10/12/24 6276

## 2024-11-04 ENCOUNTER — OFFICE VISIT (OUTPATIENT)
Dept: GASTROENTEROLOGY | Facility: CLINIC | Age: 64
End: 2024-11-04
Payer: COMMERCIAL

## 2024-11-04 VITALS
BODY MASS INDEX: 23.92 KG/M2 | SYSTOLIC BLOOD PRESSURE: 140 MMHG | HEIGHT: 72 IN | WEIGHT: 176.6 LBS | DIASTOLIC BLOOD PRESSURE: 72 MMHG

## 2024-11-04 DIAGNOSIS — R63.4 WEIGHT LOSS: Primary | ICD-10-CM

## 2024-11-04 DIAGNOSIS — K55.1 MESENTERIC ARTERY STENOSIS: ICD-10-CM

## 2024-11-04 DIAGNOSIS — R10.9 ABDOMINAL PAIN, UNSPECIFIED ABDOMINAL LOCATION: ICD-10-CM

## 2024-11-04 PROCEDURE — 99213 OFFICE O/P EST LOW 20 MIN: CPT | Performed by: INTERNAL MEDICINE

## 2024-11-04 RX ORDER — PANTOPRAZOLE SODIUM 40 MG/1
40 TABLET, DELAYED RELEASE ORAL DAILY
Qty: 90 TABLET | Refills: 0 | Status: SHIPPED | OUTPATIENT
Start: 2024-11-04 | End: 2025-02-02

## 2024-11-04 NOTE — PROGRESS NOTES
Follow Up Note     Date: 2024   Patient Name: Mikie Jimenes  MRN: [id number]  : 1960     Referring Physician: Jeffery Holloway MD    Chief Complaint:    Chief Complaint   Patient presents with    Follow-up    Weight Loss       Interval History:   2024  Mikie Jimenes is a 64 y.o. male who is here today for follow up.    He states that he has been doing well.  Does not have the abdominal pain that he was having previously.  He underwent a superior mesenteric artery angioplasty and stenting in July.  He is cutting down on smoking.     Subjective      Past Medical History:   Diagnosis Date    Hyperlipidemia     Hypertension     TIA (transient ischemic attack) 2017    Wears glasses     Wears partial dentures      Past Surgical History:   Procedure Laterality Date    CERVICAL SPINE SURGERY N/A     cadaver bones and screws implanted    COLONOSCOPY N/A 3/28/2024    Procedure: COLONOSCOPY WITH BIOPSIES AND POLYPECTOMY X4;  Surgeon: Cathie Navarro MD;  Location: [facility] ENDOSCOPY;  Service: Gastroenterology;  Laterality: N/A;    ENDOSCOPY N/A 3/28/2024    Procedure: ESOPHAGOGASTRODUODENOSCOPY WITH BIOPSIES;  Surgeon: Cathie Navarro MD;  Location: [facility] ENDOSCOPY;  Service: Gastroenterology;  Laterality: N/A;     History reviewed. No pertinent family history.  Social History     Socioeconomic History    Marital status:    Tobacco Use    Smoking status: Every Day     Current packs/day: 0.25     Average packs/day: 0.3 packs/day for 30.0 years (7.5 ttl pk-yrs)     Types: Cigarettes    Smokeless tobacco: Never   Vaping Use    Vaping status: Never Used   Substance and Sexual Activity    Alcohol use: Yes     Alcohol/week: 1.0 standard drink of alcohol     Types: 1 Drinks containing 0.5 oz of alcohol per week    Drug use: Yes     Types: Marijuana     Comment: daily    Sexual activity: Defer       Current Outpatient Medications:     amLODIPine (NORVASC) 10 MG tablet, Take 1 tablet by mouth Daily.,  "Disp: , Rfl:     bisoprolol-hydrochlorothiazide (ZIAC) 5-6.25 MG per tablet, Take 1 tablet by mouth Daily., Disp: , Rfl:     clopidogrel (PLAVIX) 75 MG tablet, Take 1 tablet by mouth Daily., Disp: , Rfl:     pantoprazole (PROTONIX) 40 MG EC tablet, Take 1 tablet by mouth Daily for 90 days., Disp: 90 tablet, Rfl: 0    pravastatin (PRAVACHOL) 20 MG tablet, Take 1 tablet by mouth Daily., Disp: , Rfl:   No Known Allergies  Review of Systems   Constitutional:  Negative for unexpected weight loss.   HENT:  Negative for trouble swallowing.    Gastrointestinal:  Negative for abdominal distention, abdominal pain, anal bleeding, blood in stool, constipation, diarrhea, nausea, rectal pain, vomiting, GERD and indigestion.       The following portions of the patient's history were reviewed and updated as appropriate: allergies, current medications, past family history, past medical history, past social history, past surgical history and problem list.    Objective     Physical Exam:  Vitals:    11/04/24 1107   BP: 140/72   Weight: 80.1 kg (176 lb 9.6 oz)   Height: 182.9 cm (72\")       Physical Exam  Constitutional:       General: He is not in acute distress.     Appearance: Normal appearance.   HENT:      Head: Normocephalic and atraumatic.   Eyes:      General: No scleral icterus.     Conjunctiva/sclera: Conjunctivae normal.   Cardiovascular:      Rate and Rhythm: Normal rate.   Pulmonary:      Effort: Pulmonary effort is normal. No respiratory distress.   Skin:     Coloration: Skin is not jaundiced or pale.   Neurological:      General: No focal deficit present.      Mental Status: He is alert and oriented to person, place, and time.   Psychiatric:         Mood and Affect: Mood normal.         Behavior: Behavior normal.         Results Review:   I reviewed the patient's new clinical results.    Admission on 10/12/2024, Discharged on 10/12/2024   Component Date Value Ref Range Status    Glucose 10/12/2024 109 (H)  65 - 99 mg/dL " Final    BUN 10/12/2024 20  8 - 23 mg/dL Final    Creatinine 10/12/2024 0.97  0.76 - 1.27 mg/dL Final    Sodium 10/12/2024 137  136 - 145 mmol/L Final    Potassium 10/12/2024 4.1  3.5 - 5.2 mmol/L Final    Slight hemolysis detected by analyzer. Result may be falsely elevated.    Chloride 10/12/2024 102  98 - 107 mmol/L Final    CO2 10/12/2024 25.1  22.0 - 29.0 mmol/L Final    Calcium 10/12/2024 9.5  8.6 - 10.5 mg/dL Final    Total Protein 10/12/2024 8.0  6.0 - 8.5 g/dL Final    Albumin 10/12/2024 4.6  3.5 - 5.2 g/dL Final    ALT (SGPT) 10/12/2024 18  1 - 41 U/L Final    AST (SGOT) 10/12/2024 31  1 - 40 U/L Final    Slight hemolysis detected by analyzer. Result may be falsely elevated.    Alkaline Phosphatase 10/12/2024 93  39 - 117 U/L Final    Total Bilirubin 10/12/2024 0.3  0.0 - 1.2 mg/dL Final    Globulin 10/12/2024 3.4  gm/dL Final    A/G Ratio 10/12/2024 1.4  g/dL Final    BUN/Creatinine Ratio 10/12/2024 20.6  7.0 - 25.0 Final    Anion Gap 10/12/2024 9.9  5.0 - 15.0 mmol/L Final    eGFR 10/12/2024 87.7  >60.0 mL/min/1.73 Final    WBC 10/12/2024 4.18  3.40 - 10.80 10*3/mm3 Final    RBC 10/12/2024 4.37  4.14 - 5.80 10*6/mm3 Final    Hemoglobin 10/12/2024 12.9 (L)  13.0 - 17.7 g/dL Final    Hematocrit 10/12/2024 38.7  37.5 - 51.0 % Final    MCV 10/12/2024 88.6  79.0 - 97.0 fL Final    MCH 10/12/2024 29.5  26.6 - 33.0 pg Final    MCHC 10/12/2024 33.3  31.5 - 35.7 g/dL Final    RDW 10/12/2024 14.6  12.3 - 15.4 % Final    RDW-SD 10/12/2024 47.0  37.0 - 54.0 fl Final    MPV 10/12/2024 9.0  6.0 - 12.0 fL Final    Platelets 10/12/2024 333  140 - 450 10*3/mm3 Final    Neutrophil % 10/12/2024 54.6  42.7 - 76.0 % Final    Lymphocyte % 10/12/2024 35.6  19.6 - 45.3 % Final    Monocyte % 10/12/2024 7.2  5.0 - 12.0 % Final    Eosinophil % 10/12/2024 1.9  0.3 - 6.2 % Final    Basophil % 10/12/2024 0.5  0.0 - 1.5 % Final    Immature Grans % 10/12/2024 0.2  0.0 - 0.5 % Final    Neutrophils, Absolute 10/12/2024 2.28  1.70 - 7.00  10*3/mm3 Final    Lymphocytes, Absolute 10/12/2024 1.49  0.70 - 3.10 10*3/mm3 Final    Monocytes, Absolute 10/12/2024 0.30  0.10 - 0.90 10*3/mm3 Final    Eosinophils, Absolute 10/12/2024 0.08  0.00 - 0.40 10*3/mm3 Final    Basophils, Absolute 10/12/2024 0.02  0.00 - 0.20 10*3/mm3 Final    Immature Grans, Absolute 10/12/2024 0.01  0.00 - 0.05 10*3/mm3 Final    nRBC 10/12/2024 0.0  0.0 - 0.2 /100 WBC Final      CT Head Without Contrast    Result Date: 10/12/2024  No acute intracranial abnormality.         CTDI: 36.88 mGy DLP:703.26 mGy.cm    This study was performed with techniques to keep radiation doses as low as reasonably achievable (ALARA). Individualized dose reduction techniques using automated exposure control or adjustment of mA and/or kV according to the patient size were employed.   This report was signed and finalized on 10/12/2024 10:50 AM by Jalyn Pinto MD.       Assessment / Plan      Diagnoses and all orders for this visit:    1. Weight loss (Primary)    2. Abdominal pain, unspecified abdominal location    3. Mesenteric artery stenosis    Other orders  -     pantoprazole (PROTONIX) 40 MG EC tablet; Take 1 tablet by mouth Daily for 90 days.  Dispense: 90 tablet; Refill: 0         Doing well.  Greatly appreciate vascular surgery for their expertise and assistance.  Reiterated smoking cessation.    Follow Up:   Return in about 6 months (around 5/4/2025).    Cathie Navarro MD  [facility]  11/4/2024  11:40 EST     Part of this note may be an electronic transcription/translation of spoken language to printed text using the Dragon Dictation System.

## (undated) DEVICE — Device

## (undated) DEVICE — VLV SXN AIR/H2O ORCAPOD3 1P/U STRL

## (undated) DEVICE — LUBE JELLY PK/2.75GM STRL BX/144

## (undated) DEVICE — HYBRID TUBING/CAP SET FOR OLYMPUS® SCOPES: Brand: ERBE

## (undated) DEVICE — FRCP BX RADJAW4 NDL 2.8 240 STD OG

## (undated) DEVICE — ENDOSCOPY PORT CONNECTOR FOR OLYMPUS® SCOPES: Brand: ERBE

## (undated) DEVICE — CONMED SCOPE SAVER BITE BLOCK, 20X27 MM: Brand: SCOPE SAVER